# Patient Record
Sex: FEMALE | URBAN - METROPOLITAN AREA
[De-identification: names, ages, dates, MRNs, and addresses within clinical notes are randomized per-mention and may not be internally consistent; named-entity substitution may affect disease eponyms.]

---

## 2021-11-23 ENCOUNTER — OFFICE VISIT (OUTPATIENT)
Dept: URBAN - METROPOLITAN AREA CLINIC 30 | Facility: CLINIC | Age: 76
End: 2021-11-23
Payer: COMMERCIAL

## 2021-11-23 DIAGNOSIS — H16.223 KERATOCONJUNCTIVITIS SICCA, BILATERAL: ICD-10-CM

## 2021-11-23 DIAGNOSIS — H35.363 DRUSEN (DEGENERATIVE) OF MACULA, BILATERAL: ICD-10-CM

## 2021-11-23 DIAGNOSIS — Z86.73 PERSONAL HISTORY OF CEREBRAL INFARCTION W/O RESIDUAL DEFICIT: ICD-10-CM

## 2021-11-23 DIAGNOSIS — E11.9 DIABETES MELLITUS TYPE 2 WITHOUT MENTION OF COMPLICATION: Primary | ICD-10-CM

## 2021-11-23 DIAGNOSIS — H40.013 OPEN ANGLE WITH BORDERLINE FINDINGS, LOW RISK, BILATERAL: ICD-10-CM

## 2021-11-23 PROCEDURE — 99204 OFFICE O/P NEW MOD 45 MIN: CPT | Performed by: OPTOMETRIST

## 2021-11-23 PROCEDURE — 92134 CPTRZ OPH DX IMG PST SGM RTA: CPT | Performed by: OPTOMETRIST

## 2021-11-23 ASSESSMENT — KERATOMETRY
OD: 45.03
OS: 45.07

## 2021-11-23 ASSESSMENT — VISUAL ACUITY
OS: 20/40
OD: 20/100

## 2021-11-23 ASSESSMENT — INTRAOCULAR PRESSURE
OS: 21
OD: 21

## 2021-11-23 NOTE — IMPRESSION/PLAN
Impression: Diabetes mellitus Type 2 without mention of complication: F78.8. Plan: Diabetes type II: no background retinopathy, no signs of neovascularization noted. Discussed ocular and systemic benefits of blood sugar control.   Send notes to PCP

## 2021-11-23 NOTE — IMPRESSION/PLAN
Impression: Keratoconjunctivitis sicca, bilateral: W64.122. +Sjogrens Plan: Mild symptoms only. Mostly xerostomia. Cont AT's qid OU. Recommend O3's. Avoid ceiling fans.

## 2021-11-23 NOTE — IMPRESSION/PLAN
Impression: Drusen (degenerative) of macula, bilateral: H35.363. Plan: Mild OU. AREDS2 for now.  See Mac Oct.

## 2021-11-23 NOTE — IMPRESSION/PLAN
Impression: Open angle with borderline findings, low risk, bilateral: H40.013. Plan: See other notes. Mild elevated IOP. Needs glc work up.

## 2021-11-23 NOTE — IMPRESSION/PLAN
Impression: Personal history of cerebral infarction w/o residual deficit: Z86.73. Plan: In 1997. Pt states VA affected. Will schedule baseline VF and RNFL. Possible right hemianopsia. Also of notes temp pallor OU. Also r/o OAG suspect due to borderline IOP.

## 2022-02-24 ENCOUNTER — OFFICE VISIT (OUTPATIENT)
Dept: URBAN - METROPOLITAN AREA CLINIC 30 | Facility: CLINIC | Age: 77
End: 2022-02-24
Payer: COMMERCIAL

## 2022-02-24 PROCEDURE — 92083 EXTENDED VISUAL FIELD XM: CPT | Performed by: OPTOMETRIST

## 2022-02-24 PROCEDURE — 76514 ECHO EXAM OF EYE THICKNESS: CPT | Performed by: OPTOMETRIST

## 2022-02-24 PROCEDURE — 92133 CPTRZD OPH DX IMG PST SGM ON: CPT | Performed by: OPTOMETRIST

## 2022-02-24 PROCEDURE — 99213 OFFICE O/P EST LOW 20 MIN: CPT | Performed by: OPTOMETRIST

## 2022-02-24 ASSESSMENT — INTRAOCULAR PRESSURE
OS: 19
OD: 17
OS: 18
OD: 18

## 2022-02-24 NOTE — IMPRESSION/PLAN
Impression: Personal history of cerebral infarction w/o residual deficit: Z86.73. Plan: In 1997. Pt states VA affected. Also noted temp pallor OU. Also r/o OAG suspect due to borderline IOP. Possibly AION OU. Will cont to monitor at this juncture. VF 2/24/22; OD inf ocular defect, early superior def, close to central defect OS inf ocular defect (inferior alt def) RNFL 2/24/22: low reliability.  OD S-60, I-58 OS S-50, I-85

## 2022-02-24 NOTE — IMPRESSION/PLAN
Impression: Open angle with borderline findings, low risk, bilateral: H40.013. Plan: Pallor in the absence of cupping. Will monitor without tx at this time. See other notes for diagnostics.

## 2022-09-20 ENCOUNTER — OFFICE VISIT (OUTPATIENT)
Dept: URBAN - METROPOLITAN AREA CLINIC 30 | Facility: CLINIC | Age: 77
End: 2022-09-20
Payer: COMMERCIAL

## 2022-09-20 DIAGNOSIS — H35.363 DRUSEN (DEGENERATIVE) OF MACULA, BILATERAL: ICD-10-CM

## 2022-09-20 DIAGNOSIS — Z86.73 PERSONAL HISTORY OF CEREBRAL INFARCTION W/O RESIDUAL DEFICIT: ICD-10-CM

## 2022-09-20 DIAGNOSIS — H25.813 COMBINED FORMS OF AGE-RELATED CATARACT, BILATERAL: ICD-10-CM

## 2022-09-20 DIAGNOSIS — H16.223 KERATOCONJUNCTIVITIS SICCA, BILATERAL: ICD-10-CM

## 2022-09-20 DIAGNOSIS — H40.013 OPEN ANGLE WITH BORDERLINE FINDINGS, LOW RISK, BILATERAL: ICD-10-CM

## 2022-09-20 DIAGNOSIS — H47.013 ISCHEMIC OPTIC NEUROPATHY, BILATERAL: ICD-10-CM

## 2022-09-20 DIAGNOSIS — H52.4 PRESBYOPIA: ICD-10-CM

## 2022-09-20 DIAGNOSIS — E11.9 DIABETES MELLITUS TYPE 2 WITHOUT MENTION OF COMPLICATION: Primary | ICD-10-CM

## 2022-09-20 PROCEDURE — 92014 COMPRE OPH EXAM EST PT 1/>: CPT | Performed by: OPTOMETRIST

## 2022-09-20 PROCEDURE — 92250 FUNDUS PHOTOGRAPHY W/I&R: CPT | Performed by: OPTOMETRIST

## 2022-09-20 PROCEDURE — 92133 CPTRZD OPH DX IMG PST SGM ON: CPT | Performed by: OPTOMETRIST

## 2022-09-20 RX ORDER — LATANOPROST 50 UG/ML
0.005 % SOLUTION OPHTHALMIC
Qty: 5 | Refills: 0 | Status: ACTIVE
Start: 2022-09-20

## 2022-09-20 ASSESSMENT — KERATOMETRY
OD: 44.85
OS: 45.16

## 2022-09-20 ASSESSMENT — INTRAOCULAR PRESSURE
OD: 18
OD: 20
OS: 20

## 2022-09-20 ASSESSMENT — VISUAL ACUITY
OS: 20/30
OD: 20/125

## 2022-09-20 NOTE — IMPRESSION/PLAN
Impression: Diabetes mellitus Type 2 without mention of complication: A46.2. Plan: No diabetic retinopathy OU. No Diabetic Macular Edema noted OU. Recommend yearly diabetic eye exam. Emphasized importance of strict BS control, diet, exercise, and BP control to avoid risk of loss of vision. Recommend regular, ongoing follow-ups with their PCP to monitor DM as well. Most recent A1C  unknown, per pt.

## 2022-09-20 NOTE — IMPRESSION/PLAN
Impression: Drusen (degenerative) of macula, bilateral: H35.363. Plan: Stable. Mild OU. AREDS2 for now. Confirmed on MAC OCT.

## 2022-09-20 NOTE — IMPRESSION/PLAN
Impression: Ischemic optic neuropathy, bilateral: H47.013. Plan: Suspected. OD>OS. Pallor noted OU c mild cupping. CTM. See other notes.

## 2022-09-20 NOTE — IMPRESSION/PLAN
Impression: Open angle with borderline findings, low risk, bilateral: H40.013. Plan: Pallor in the absence of cupping. IOP remains  borderline OU. Start Latanoprost QHS OU to lower risk of progression, likely most of the current findings are non glaucomatous in nature. Suspect AIONOU. 

9/2022 RNFL OD) Unreliable OS) Sup 48, Inf 85 less reliable. GCA 58,63- less reliable. 9/2022 disc photo documentation- poor quality.

## 2022-09-20 NOTE — IMPRESSION/PLAN
Impression: Personal history of cerebral infarction w/o residual deficit: Z86.73. Plan: In 1997. Pt states VA affected. Also noted temp pallor OU. Also r/o OAG suspect due to borderline IOP. Possibly AION OU. Will cont to monitor at this juncture. VF 2/24/22; OD inf arc defect, early superior def, close to central  fixation OS inf arcuate defect (inferior alt def) RNFL 2/24/22: low reliability.  OD S-60, I-58 OS S-50, I-85

## 2022-09-20 NOTE — IMPRESSION/PLAN
Impression: Keratoconjunctivitis sicca, bilateral: O15.485. +Sjogrens Plan: Remains mild symptoms only. Mostly xerostomia. Continue AT's qid OU. Recommend O3's. Avoid ceiling fans.

## 2022-11-15 ENCOUNTER — OFFICE VISIT (OUTPATIENT)
Dept: URBAN - METROPOLITAN AREA CLINIC 30 | Facility: CLINIC | Age: 77
End: 2022-11-15
Payer: COMMERCIAL

## 2022-11-15 DIAGNOSIS — H16.223 KERATOCONJUNCTIVITIS SICCA, BILATERAL: ICD-10-CM

## 2022-11-15 DIAGNOSIS — H47.013 ISCHEMIC OPTIC NEUROPATHY, BILATERAL: ICD-10-CM

## 2022-11-15 DIAGNOSIS — H40.013 OPEN ANGLE WITH BORDERLINE FINDINGS, LOW RISK, BILATERAL: Primary | ICD-10-CM

## 2022-11-15 PROCEDURE — 99213 OFFICE O/P EST LOW 20 MIN: CPT | Performed by: OPTOMETRIST

## 2022-11-15 ASSESSMENT — INTRAOCULAR PRESSURE
OD: 16
OS: 15

## 2022-11-15 NOTE — IMPRESSION/PLAN
Impression: Keratoconjunctivitis sicca, bilateral: N16.676. +Sjogrens; Dry Mouth Plan: Remains mild symptoms only. Mostly xerostomia. Continue use of AT's qid OU. Recommend O3's. Avoid ceiling fans. Taking Pilocarpine tid 5mg PO. Consider DEC.

## 2022-11-15 NOTE — IMPRESSION/PLAN
Impression: Open angle with borderline findings, low risk, bilateral: H40.013. TMAX 21, 21 Plan: Pallor in the absence of cupping. IOP stable OU. Likely most of the current findings are non glaucomatous in nature. Suspect AIONOU. PLAN:
1. Cont Latanoprost QHS OU to lower risk of progression, l
2. Monitor q6 months. RTC IOP + RNFL/GCA + . 

9/2022 RNFL OD) Unreliable OS) Sup 48, Inf 85 less reliable. GCA 58,63- less reliable. 9/2022 disc photo documentation- poor quality. 2/2022  OD>OS OU) inf altitudinal defects; inf arc defect; enlarged BS.

## 2022-11-15 NOTE — IMPRESSION/PLAN
Impression: Ischemic optic neuropathy, bilateral: H47.013. Plan: Suspected. OD>OS. Pallor noted OU c mild cupping. CTM. See other notes for correlations.

## 2023-05-22 ENCOUNTER — OFFICE VISIT (OUTPATIENT)
Dept: URBAN - METROPOLITAN AREA CLINIC 30 | Facility: CLINIC | Age: 78
End: 2023-05-22
Payer: COMMERCIAL

## 2023-05-22 DIAGNOSIS — H40.013 OPEN ANGLE WITH BORDERLINE FINDINGS, LOW RISK, BILATERAL: Primary | ICD-10-CM

## 2023-05-22 PROCEDURE — 92133 CPTRZD OPH DX IMG PST SGM ON: CPT | Performed by: OPTOMETRIST

## 2023-05-22 PROCEDURE — 92083 EXTENDED VISUAL FIELD XM: CPT | Performed by: OPTOMETRIST

## 2023-05-22 PROCEDURE — 99213 OFFICE O/P EST LOW 20 MIN: CPT | Performed by: OPTOMETRIST

## 2023-05-22 RX ORDER — LATANOPROST 50 UG/ML
0.005 % SOLUTION OPHTHALMIC
Qty: 7.5 | Refills: 3 | Status: ACTIVE
Start: 2023-05-22

## 2023-05-22 ASSESSMENT — INTRAOCULAR PRESSURE
OD: 18
OS: 21

## 2023-05-22 NOTE — IMPRESSION/PLAN
Impression: Open angle with borderline findings, low risk, bilateral: H40.013. TMAX 21, 21 Plan: Pallor in the absence of cupping. IOP stable OD, slightly elevated OS- has not used gtts x 1 months. Likely most of the current findings are non glaucomatous in nature. Suspect AIONOU. 

9/2022 RNFL OD) Unreliable OS) Sup 48, Inf 85 less reliable. GCA 58,63- less reliable. 5/2023 RNFL OD) Lower relibility S- 33, I- 60 OS) I-51, I-84 GCA) lower reliability OU 59,44

9/2022 disc photo documentation- poor quality. 2/2022  OD>OS OU) inf altitudinal defects; inf arc defect; enlarged BS.
5/2023  OD) inf arc defect; early sup arc defect OS) inf arc defect; sup arc defect PLAN:
1. Cont Latanoprost QHS OU to lower risk of progression. Pt has not used gtts x 2 months. Will restart QHS OU. 
2. Discussed importance of compliance to medical treatment and follow up due to risk of permanent LOV/blindness. Pt appears to be tolerating medication well. Pt needs to bring glc meds to all visits. 2. Monitor q6 months. RTC 6 month CE c Disc Photos.

## 2023-11-20 ENCOUNTER — Encounter (OUTPATIENT)
Dept: URBAN - METROPOLITAN AREA CLINIC 30 | Facility: CLINIC | Age: 78
End: 2023-11-20
Payer: COMMERCIAL

## 2023-11-20 PROCEDURE — 92014 COMPRE OPH EXAM EST PT 1/>: CPT | Performed by: OPTOMETRIST

## 2025-01-02 ENCOUNTER — OFFICE VISIT (OUTPATIENT)
Dept: URBAN - METROPOLITAN AREA CLINIC 30 | Facility: CLINIC | Age: 80
End: 2025-01-02
Payer: MEDICARE

## 2025-01-02 DIAGNOSIS — H16.223 KERATOCONJUNCTIVITIS SICCA, BILATERAL: ICD-10-CM

## 2025-01-02 DIAGNOSIS — H35.363 DRUSEN (DEGENERATIVE) OF MACULA, BILATERAL: ICD-10-CM

## 2025-01-02 DIAGNOSIS — H47.013 ISCHEMIC OPTIC NEUROPATHY, BILATERAL: ICD-10-CM

## 2025-01-02 DIAGNOSIS — H40.013 OPEN ANGLE WITH BORDERLINE FINDINGS, LOW RISK, BILATERAL: Primary | ICD-10-CM

## 2025-01-02 PROCEDURE — 92134 CPTRZ OPH DX IMG PST SGM RTA: CPT | Performed by: OPTOMETRIST

## 2025-01-02 PROCEDURE — 92014 COMPRE OPH EXAM EST PT 1/>: CPT | Performed by: OPTOMETRIST

## 2025-01-02 RX ORDER — LATANOPROST 50 UG/ML
0.005 % SOLUTION OPHTHALMIC
Qty: 7.5 | Refills: 3 | Status: ACTIVE
Start: 2025-01-02

## 2025-01-02 ASSESSMENT — VISUAL ACUITY
OS: 20/30
OD: 20/100

## 2025-01-02 ASSESSMENT — KERATOMETRY
OS: 45.25
OD: 45.25

## 2025-01-02 ASSESSMENT — INTRAOCULAR PRESSURE
OS: 16
OD: 15

## 2025-07-07 ENCOUNTER — OFFICE VISIT (OUTPATIENT)
Dept: URBAN - METROPOLITAN AREA CLINIC 30 | Facility: CLINIC | Age: 80
End: 2025-07-07
Payer: MEDICARE

## 2025-07-07 DIAGNOSIS — H40.013 OPEN ANGLE WITH BORDERLINE FINDINGS, LOW RISK, BILATERAL: Primary | ICD-10-CM

## 2025-07-07 DIAGNOSIS — H52.4 PRESBYOPIA: ICD-10-CM

## 2025-07-07 PROCEDURE — 92083 EXTENDED VISUAL FIELD XM: CPT | Performed by: OPTOMETRIST

## 2025-07-07 PROCEDURE — 92133 CPTRZD OPH DX IMG PST SGM ON: CPT | Performed by: OPTOMETRIST

## 2025-07-07 PROCEDURE — 99213 OFFICE O/P EST LOW 20 MIN: CPT | Performed by: OPTOMETRIST

## 2025-07-07 ASSESSMENT — INTRAOCULAR PRESSURE
OS: 17
OD: 13